# Patient Record
Sex: MALE | Race: WHITE | NOT HISPANIC OR LATINO | Employment: FULL TIME | URBAN - METROPOLITAN AREA
[De-identification: names, ages, dates, MRNs, and addresses within clinical notes are randomized per-mention and may not be internally consistent; named-entity substitution may affect disease eponyms.]

---

## 2024-09-06 ENCOUNTER — PATIENT MESSAGE (OUTPATIENT)
Dept: INTERNAL MEDICINE | Facility: CLINIC | Age: 34
End: 2024-09-06

## 2024-09-06 ENCOUNTER — LAB VISIT (OUTPATIENT)
Dept: LAB | Facility: HOSPITAL | Age: 34
End: 2024-09-06
Payer: COMMERCIAL

## 2024-09-06 ENCOUNTER — OFFICE VISIT (OUTPATIENT)
Dept: INTERNAL MEDICINE | Facility: CLINIC | Age: 34
End: 2024-09-06
Payer: COMMERCIAL

## 2024-09-06 VITALS
DIASTOLIC BLOOD PRESSURE: 76 MMHG | BODY MASS INDEX: 26.09 KG/M2 | OXYGEN SATURATION: 99 % | HEIGHT: 67 IN | WEIGHT: 166.25 LBS | HEART RATE: 81 BPM | SYSTOLIC BLOOD PRESSURE: 136 MMHG

## 2024-09-06 DIAGNOSIS — R21 RASH: ICD-10-CM

## 2024-09-06 DIAGNOSIS — Z00.00 ANNUAL PHYSICAL EXAM: Primary | ICD-10-CM

## 2024-09-06 DIAGNOSIS — Z12.83 SKIN EXAM, SCREENING FOR CANCER: ICD-10-CM

## 2024-09-06 DIAGNOSIS — J34.2 DEVIATED SEPTUM: ICD-10-CM

## 2024-09-06 DIAGNOSIS — Z11.59 NEED FOR HEPATITIS C SCREENING TEST: ICD-10-CM

## 2024-09-06 DIAGNOSIS — Z11.4 SCREENING FOR HIV (HUMAN IMMUNODEFICIENCY VIRUS): ICD-10-CM

## 2024-09-06 DIAGNOSIS — Z13.220 SCREENING FOR LIPID DISORDERS: ICD-10-CM

## 2024-09-06 DIAGNOSIS — Z00.00 ANNUAL PHYSICAL EXAM: ICD-10-CM

## 2024-09-06 PROBLEM — K30 FUNCTIONAL DYSPEPSIA: Status: ACTIVE | Noted: 2024-09-06

## 2024-09-06 PROBLEM — K62.5 RECTAL BLEEDING: Status: ACTIVE | Noted: 2024-09-06

## 2024-09-06 PROBLEM — R10.84 GENERALIZED ABDOMINAL PAIN: Status: ACTIVE | Noted: 2024-09-06

## 2024-09-06 PROBLEM — R19.8 CHANGE IN BOWEL FUNCTION: Status: ACTIVE | Noted: 2024-09-06

## 2024-09-06 LAB
ALBUMIN SERPL BCP-MCNC: 4.6 G/DL (ref 3.5–5.2)
ALP SERPL-CCNC: 45 U/L (ref 55–135)
ALT SERPL W/O P-5'-P-CCNC: 13 U/L (ref 10–44)
ANION GAP SERPL CALC-SCNC: 10 MMOL/L (ref 8–16)
AST SERPL-CCNC: 22 U/L (ref 10–40)
BASOPHILS # BLD AUTO: 0.03 K/UL (ref 0–0.2)
BASOPHILS NFR BLD: 0.6 % (ref 0–1.9)
BILIRUB SERPL-MCNC: 1.2 MG/DL (ref 0.1–1)
BUN SERPL-MCNC: 10 MG/DL (ref 6–20)
CALCIUM SERPL-MCNC: 9.4 MG/DL (ref 8.7–10.5)
CHLORIDE SERPL-SCNC: 104 MMOL/L (ref 95–110)
CHOLEST SERPL-MCNC: 194 MG/DL (ref 120–199)
CHOLEST/HDLC SERPL: 2.5 {RATIO} (ref 2–5)
CO2 SERPL-SCNC: 23 MMOL/L (ref 23–29)
CREAT SERPL-MCNC: 0.8 MG/DL (ref 0.5–1.4)
DIFFERENTIAL METHOD BLD: ABNORMAL
EOSINOPHIL # BLD AUTO: 0.1 K/UL (ref 0–0.5)
EOSINOPHIL NFR BLD: 1.5 % (ref 0–8)
ERYTHROCYTE [DISTWIDTH] IN BLOOD BY AUTOMATED COUNT: 12.9 % (ref 11.5–14.5)
EST. GFR  (NO RACE VARIABLE): >60 ML/MIN/1.73 M^2
GLUCOSE SERPL-MCNC: 81 MG/DL (ref 70–110)
HCT VFR BLD AUTO: 46.6 % (ref 40–54)
HCV AB SERPL QL IA: NORMAL
HDLC SERPL-MCNC: 78 MG/DL (ref 40–75)
HDLC SERPL: 40.2 % (ref 20–50)
HGB BLD-MCNC: 15.6 G/DL (ref 14–18)
HIV 1+2 AB+HIV1 P24 AG SERPL QL IA: NORMAL
IMM GRANULOCYTES # BLD AUTO: 0.02 K/UL (ref 0–0.04)
IMM GRANULOCYTES NFR BLD AUTO: 0.4 % (ref 0–0.5)
LDLC SERPL CALC-MCNC: 104.2 MG/DL (ref 63–159)
LYMPHOCYTES # BLD AUTO: 1.4 K/UL (ref 1–4.8)
LYMPHOCYTES NFR BLD: 28.8 % (ref 18–48)
MCH RBC QN AUTO: 32.1 PG (ref 27–31)
MCHC RBC AUTO-ENTMCNC: 33.5 G/DL (ref 32–36)
MCV RBC AUTO: 96 FL (ref 82–98)
MONOCYTES # BLD AUTO: 0.5 K/UL (ref 0.3–1)
MONOCYTES NFR BLD: 9.5 % (ref 4–15)
NEUTROPHILS # BLD AUTO: 2.8 K/UL (ref 1.8–7.7)
NEUTROPHILS NFR BLD: 59.2 % (ref 38–73)
NONHDLC SERPL-MCNC: 116 MG/DL
NRBC BLD-RTO: 0 /100 WBC
PLATELET # BLD AUTO: 225 K/UL (ref 150–450)
PMV BLD AUTO: 10.5 FL (ref 9.2–12.9)
POTASSIUM SERPL-SCNC: 4.2 MMOL/L (ref 3.5–5.1)
PROT SERPL-MCNC: 7.2 G/DL (ref 6–8.4)
RBC # BLD AUTO: 4.86 M/UL (ref 4.6–6.2)
SODIUM SERPL-SCNC: 137 MMOL/L (ref 136–145)
TRIGL SERPL-MCNC: 59 MG/DL (ref 30–150)
WBC # BLD AUTO: 4.75 K/UL (ref 3.9–12.7)

## 2024-09-06 PROCEDURE — 80053 COMPREHEN METABOLIC PANEL: CPT

## 2024-09-06 PROCEDURE — 87389 HIV-1 AG W/HIV-1&-2 AB AG IA: CPT

## 2024-09-06 PROCEDURE — 36415 COLL VENOUS BLD VENIPUNCTURE: CPT

## 2024-09-06 PROCEDURE — 85025 COMPLETE CBC W/AUTO DIFF WBC: CPT

## 2024-09-06 PROCEDURE — 86803 HEPATITIS C AB TEST: CPT

## 2024-09-06 PROCEDURE — 80061 LIPID PANEL: CPT

## 2024-09-06 PROCEDURE — 99999 PR PBB SHADOW E&M-NEW PATIENT-LVL V: CPT | Mod: PBBFAC,,,

## 2024-09-06 RX ORDER — CLOTRIMAZOLE AND BETAMETHASONE DIPROPIONATE 10; .64 MG/G; MG/G
CREAM TOPICAL 2 TIMES DAILY
Qty: 15 G | Refills: 1 | Status: SHIPPED | OUTPATIENT
Start: 2024-09-06

## 2024-09-06 RX ORDER — CLOTRIMAZOLE AND BETAMETHASONE DIPROPIONATE 10; .64 MG/G; MG/G
CREAM TOPICAL 2 TIMES DAILY
Qty: 15 G | Refills: 1 | Status: SHIPPED | OUTPATIENT
Start: 2024-09-06 | End: 2024-09-06

## 2024-09-06 NOTE — PROGRESS NOTES
Gurinder Tee  1990        Subjective     Chief Complaint: Annual Exam    History of Present Illness:  Mr. Gurinder Tee is a 34 y.o. male with a medical hx of H. Pylori infection (treated), hematemesis, and GERD who presents to clinic for his annual exam. He has not seen a doctor in many years and would like to re-establish care and get routine blood work. He has several issues today, 9/6/24, as well.     Skin Lesions: Pt has several skin lesions on his back he would like to have evaluated. He has had moles removed in the past by Dermatology. He states his moles have grown back, on some are painful now too.     Deviated Septum: Hx of deviated septum corrected with surgery at 18 years old. Admits he has had his nose broken 3 times since surgical correction. He now reports difficulty with airway in his L septum, also snores very loudly now.    Buttock Rash: Has had rash on his buttocks for the past 3-4 years. Describes rash as dry, irritated skin. He has taken over the counter lotions and has even been prescribed antifungals in the past with minimal alleviation of symptoms.      Health Maintenance Checklist:   Alcohol - Drinks beer and wine on a regular basis, 3 glasses of wine daily  Drug Use - none  Intimate Partner Violence - none  Depression- none  STIs - none  Vaccines - up to date  Cancer - skin cancer in family  Diet - well balanced diet  Smoking - none  HTN - none  HLD - none  Obesity - none  Activity - exercises 5 times a week  Diabetes - none  Sexual Activity - sexually active in monogamous relationship with wife of 10 years     Review of Systems   Constitutional:  Negative for chills and fever.   HENT:  Negative for ear pain and hearing loss.    Eyes:  Negative for blurred vision, double vision and photophobia.   Respiratory:  Negative for cough and shortness of breath.    Cardiovascular:  Negative for chest pain, palpitations and leg swelling.   Gastrointestinal:  Negative for abdominal pain, blood  in stool, constipation, diarrhea, nausea and vomiting.   Genitourinary:  Negative for dysuria and hematuria.   Musculoskeletal:         Skin rash   Skin:  Negative for rash.   Neurological:  Negative for dizziness, weakness and headaches.   Psychiatric/Behavioral:  Negative for depression and suicidal ideas.         PAST HISTORY:     Past Medical History:   Diagnosis Date    Deviated septum     H pylori ulcer     Hematemesis        Past Surgical History:   Procedure Laterality Date    NASAL SEPTUM SURGERY      TONSILLECTOMY         Family History   Problem Relation Name Age of Onset    Colon cancer Neg Hx      Colon polyps Neg Hx      Stomach cancer Neg Hx      Rectal cancer Neg Hx      Esophageal cancer Neg Hx         Social History     Socioeconomic History    Marital status:    Tobacco Use    Smoking status: Never    Smokeless tobacco: Never   Substance and Sexual Activity    Alcohol use: Yes     Alcohol/week: 24.0 standard drinks of alcohol     Types: 21 Glasses of wine, 3 Cans of beer per week    Drug use: Yes     Types: Marijuana     Comment: every other day    Sexual activity: Yes     Partners: Female     Social Determinants of Health     Financial Resource Strain: Low Risk  (9/5/2024)    Overall Financial Resource Strain (CARDIA)     Difficulty of Paying Living Expenses: Not very hard   Food Insecurity: No Food Insecurity (9/5/2024)    Hunger Vital Sign     Worried About Running Out of Food in the Last Year: Never true     Ran Out of Food in the Last Year: Never true   Physical Activity: Sufficiently Active (9/5/2024)    Exercise Vital Sign     Days of Exercise per Week: 5 days     Minutes of Exercise per Session: 90 min   Stress: Stress Concern Present (9/5/2024)    [employer] - Occupational Stress Questionnaire     Feeling of Stress : Very much   Housing Stability: Unknown (9/5/2024)    Housing Stability Vital Sign     Unable to Pay for Housing in the Last Year: No  "      MEDICATIONS & ALLERGIES:     Current Outpatient Medications on File Prior to Visit   Medication Sig    omeprazole (PRILOSEC) 40 MG capsule Take 1 capsule (40 mg total) by mouth every morning.     No current facility-administered medications on file prior to visit.       Review of patient's allergies indicates:  No Known Allergies    OBJECTIVE:     Vital Signs:  Vitals:    09/06/24 1020   BP: 136/76   BP Location: Left arm   Patient Position: Sitting   BP Method: Medium (Manual)   Pulse: 81   SpO2: 99%   Weight: 75.4 kg (166 lb 3.6 oz)   Height: 5' 7" (1.702 m)       Body mass index is 26.03 kg/m².     Physical Exam:  Physical Exam  Vitals and nursing note reviewed.   Constitutional:       Appearance: Normal appearance.   HENT:      Head: Normocephalic and atraumatic.      Nose: Nasal deformity and septal deviation present.      Mouth/Throat:      Mouth: Mucous membranes are moist.      Pharynx: Oropharynx is clear.   Eyes:      Extraocular Movements: Extraocular movements intact.      Conjunctiva/sclera: Conjunctivae normal.   Cardiovascular:      Rate and Rhythm: Normal rate and regular rhythm.      Pulses: Normal pulses.      Heart sounds: Normal heart sounds. No murmur heard.  Pulmonary:      Effort: Pulmonary effort is normal. No respiratory distress.      Breath sounds: Normal breath sounds. No wheezing, rhonchi or rales.   Abdominal:      General: Abdomen is flat. Bowel sounds are normal. There is no distension.      Palpations: Abdomen is soft.      Tenderness: There is no abdominal tenderness.   Musculoskeletal:         General: No swelling. Normal range of motion.      Cervical back: Normal range of motion.      Right lower leg: No edema.      Left lower leg: No edema.   Skin:     General: Skin is warm and dry.      Capillary Refill: Capillary refill takes less than 2 seconds.      Findings: Rash present.      Comments: Buttock rash located at top, mainly on R gluteal cheek    Neurological:      " "General: No focal deficit present.      Mental Status: He is alert and oriented to person, place, and time. Mental status is at baseline.      Motor: No weakness.   Psychiatric:         Mood and Affect: Mood normal.         Behavior: Behavior normal.                      Laboratory  No results found for: "WBC", "HGB", "HCT", "MCV", "PLT"  No results found for: "GLU", "NA", "K", "CL", "CO2", "BUN", "CREATININE", "CALCIUM", "MG"  No results found for: "INR", "PROTIME"  No results found for: "HGBA1C"  No results for input(s): "POCTGLUCOSE" in the last 72 hours.      Health Maintenance         Date Due Completion Date    Hepatitis C Screening Never done ---    Lipid Panel Never done ---    HIV Screening Never done ---    Influenza Vaccine (1) 09/01/2024 12/10/2014    COVID-19 Vaccine (2 - 2023-24 season) 09/01/2024 7/26/2021    TETANUS VACCINE 04/20/2028 4/20/2018              ASSESSMENT & PLAN:   Mr. Gurinder Tee is a 34 y.o. male who was seen today in clinic for annual exam. Overall he is doing well. Will make referral to ENT for deviated septum, I fear he may need another surgical correction given hx of trauma to the nose after initial surgical correction.     For his skin lesions, pictures available in media. Lesions are consistent with skin tags and possible lipoma in L upper back. Nevertheless, pt would benefit from a routine skin examination given his strong family hx of melanoma. He would also like skin lesions removed for cosmetic reasons. Referral made to Dermatology.    For his buttock rash, does not cause him significant pain. Upon examination there is a defined border around the rash, and central areas are very dry skin. Will prescribe Clotrimazole antifungal cream. Also educated on using over the counter Men's powder to help keep area dry going forward.     Will get routine lab work done today, 9/6/24.    Educated pt to cut back on alcohol use.     Will see again in 1 year for annual, or sooner if other " needs arise.     Gurinder was seen today for establish care.    Diagnoses and all orders for this visit:    Annual physical exam  -     CBC W/ AUTO DIFFERENTIAL; Future  -     COMPREHENSIVE METABOLIC PANEL; Future    Skin exam, screening for cancer  -     Ambulatory referral/consult to Dermatology; Future    Screening for HIV (human immunodeficiency virus)  -     HIV 1/2 Ag/Ab (4th Gen); Future    Need for hepatitis C screening test  -     HEPATITIS C ANTIBODY; Future    Rash    Screening for lipid disorders  -     Lipid Panel; Future    Deviated septum  -     Ambulatory referral/consult to ENT; Future    Other orders  -     Discontinue: clotrimazole-betamethasone 1-0.05% (LOTRISONE) cream; Apply topically 2 (two) times daily.  -     clotrimazole-betamethasone 1-0.05% (LOTRISONE) cream; Apply topically 2 (two) times daily.         1. Annual physical exam    2. Skin exam, screening for cancer    3. Screening for HIV (human immunodeficiency virus)    4. Need for hepatitis C screening test    5. Rash    6. Screening for lipid disorders    7. Deviated septum        RTC in 1 year    Quan Avila DO  Internal Medicine PGY-3  [facility]  [address] [place] [address]

## 2024-09-06 NOTE — PATIENT INSTRUCTIONS
Apply fungal cream to rash twice daily for the next 1-4 weeks or until the rash resolves. Try using Men's powder products to help keep area dry.     Make appointments with ENT and Dermatology.

## 2024-09-09 ENCOUNTER — OFFICE VISIT (OUTPATIENT)
Dept: OTOLARYNGOLOGY | Facility: CLINIC | Age: 34
End: 2024-09-09
Payer: COMMERCIAL

## 2024-09-09 VITALS
BODY MASS INDEX: 26.26 KG/M2 | SYSTOLIC BLOOD PRESSURE: 125 MMHG | WEIGHT: 167.69 LBS | DIASTOLIC BLOOD PRESSURE: 84 MMHG | HEART RATE: 65 BPM

## 2024-09-09 DIAGNOSIS — Z98.890 HISTORY OF NASAL SEPTOPLASTY: ICD-10-CM

## 2024-09-09 DIAGNOSIS — J34.2 DEVIATED SEPTUM: Primary | Chronic | ICD-10-CM

## 2024-09-09 DIAGNOSIS — M95.0 NASAL VALVE COLLAPSE: Chronic | ICD-10-CM

## 2024-09-09 DIAGNOSIS — R06.83 SNORING: Chronic | ICD-10-CM

## 2024-09-09 DIAGNOSIS — J31.0 CHRONIC RHINITIS: Chronic | ICD-10-CM

## 2024-09-09 PROCEDURE — 3079F DIAST BP 80-89 MM HG: CPT | Mod: CPTII,S$GLB,, | Performed by: OTOLARYNGOLOGY

## 2024-09-09 PROCEDURE — 1159F MED LIST DOCD IN RCRD: CPT | Mod: CPTII,S$GLB,, | Performed by: OTOLARYNGOLOGY

## 2024-09-09 PROCEDURE — 31575 DIAGNOSTIC LARYNGOSCOPY: CPT | Mod: S$GLB,,, | Performed by: OTOLARYNGOLOGY

## 2024-09-09 PROCEDURE — 99999 PR PBB SHADOW E&M-EST. PATIENT-LVL III: CPT | Mod: PBBFAC,,, | Performed by: OTOLARYNGOLOGY

## 2024-09-09 PROCEDURE — 1160F RVW MEDS BY RX/DR IN RCRD: CPT | Mod: CPTII,S$GLB,, | Performed by: OTOLARYNGOLOGY

## 2024-09-09 PROCEDURE — 3008F BODY MASS INDEX DOCD: CPT | Mod: CPTII,S$GLB,, | Performed by: OTOLARYNGOLOGY

## 2024-09-09 PROCEDURE — 99204 OFFICE O/P NEW MOD 45 MIN: CPT | Mod: 25,S$GLB,, | Performed by: OTOLARYNGOLOGY

## 2024-09-09 PROCEDURE — 3074F SYST BP LT 130 MM HG: CPT | Mod: CPTII,S$GLB,, | Performed by: OTOLARYNGOLOGY

## 2024-09-09 RX ORDER — FLUTICASONE PROPIONATE 50 MCG
1 SPRAY, SUSPENSION (ML) NASAL 2 TIMES DAILY
Qty: 11.1 ML | Refills: 11 | Status: SHIPPED | OUTPATIENT
Start: 2024-09-09 | End: 2024-10-09

## 2024-09-09 RX ORDER — AZELASTINE 1 MG/ML
1 SPRAY, METERED NASAL 2 TIMES DAILY
Qty: 30 ML | Refills: 11 | Status: SHIPPED | OUTPATIENT
Start: 2024-09-09 | End: 2024-10-09

## 2024-09-09 NOTE — PROGRESS NOTES
Chief Complaint   Patient presents with    Snoring     Very bad snore also stated that he has a deviated septum has had surgery in the past but nothing has changed overtime     Sinus Problem     Also states that when waking up in morning throat is swollen         HPI:   The pt is a 34 y.o. male with nasal congestion of many years duration. The congestion is reported to be moderate. Associated signs and symptoms are post nasal drip and sniffling. The patient ( patient representative) denies purulent runny nose, sneezing, sniffling, facial pain, headache, and cough.    There is a history of snoring. There is a of sleep disturbance . The following sleep abnormalities are noted: restless sleep, apnea, frequent awakening .  Patient feels that the snoring and nasal obstruction affect the quality of his sleep and are causing excessive daytime somnolence and fatigue.    The patient does not have asthma.  The patient does not have eczema.  The patient has been diagnosed with allergic rhinitis.     The patient has been treated with: OTC antihistamines and intranasal steroids, but Flonase has only used p.r.n..    The response to the above noted treatment is described as: minimal improvement.   The patient had septoplasty at age 18- had nasal trauma since that time (first time 2-3 mos after surgery)- at least 3 times.  No further surgeries were performed after the nasal traumas.    Uses breathe right strips at night- these help.                  Past Medical History:   Diagnosis Date    Deviated septum     H pylori ulcer     Hematemesis      Social History     Socioeconomic History    Marital status:    Tobacco Use    Smoking status: Never    Smokeless tobacco: Never   Substance and Sexual Activity    Alcohol use: Yes     Alcohol/week: 24.0 standard drinks of alcohol     Types: 21 Glasses of wine, 3 Cans of beer per week    Drug use: Yes     Types: Marijuana     Comment: every other day    Sexual activity: Yes      Partners: Female     Social Determinants of Health     Financial Resource Strain: Low Risk  (9/5/2024)    Overall Financial Resource Strain (CARDIA)     Difficulty of Paying Living Expenses: Not very hard   Food Insecurity: No Food Insecurity (9/5/2024)    Hunger Vital Sign     Worried About Running Out of Food in the Last Year: Never true     Ran Out of Food in the Last Year: Never true   Physical Activity: Sufficiently Active (9/5/2024)    Exercise Vital Sign     Days of Exercise per Week: 5 days     Minutes of Exercise per Session: 90 min   Stress: Stress Concern Present (9/5/2024)    [employer] - Occupational Stress Questionnaire     Feeling of Stress : Very much   Housing Stability: Unknown (9/5/2024)    Housing Stability Vital Sign     Unable to Pay for Housing in the Last Year: No     Past Surgical History:   Procedure Laterality Date    NASAL SEPTUM SURGERY      TONSILLECTOMY       Family History   Problem Relation Name Age of Onset    Colon cancer Neg Hx      Colon polyps Neg Hx      Stomach cancer Neg Hx      Rectal cancer Neg Hx      Esophageal cancer Neg Hx             Review of Systems  General: negative for chills, fever or weight loss  Psychological: negative for mood changes or depression  Ophthalmic: negative for blurry vision, photophobia or eye pain  ENT: see HPI  Respiratory: no cough, shortness of breath, or wheezing  Cardiovascular: no chest pain or dyspnea on exertion  Gastrointestinal: no abdominal pain, change in bowel habits, or black/ bloody stools  Musculoskeletal: negative for gait disturbance or muscular weakness  Neurological: no syncope or seizures; no ataxia  Dermatological: negative for pruritis,  rash and jaundice  Hematologic/lymphatic: no easy bruising, no new adenopathy      Physical Exam:    Vitals:    09/09/24 0906   BP: 125/84   Pulse: 65         Constitutional:   He is oriented to person, place, and time. Vital signs are normal. He appears  well-developed and well-nourished. He appears alert. He is cooperative. Normal speech.      Head:  Normocephalic and atraumatic. Head is without TMJ tenderness. Salivary glands normal.  Facial strength is normal.      Ears:  Hearing normal to normal and whispered voice; external ear normal without scars, lesions, or masses; ear canal, tympanic membrane, and middle ear normal., right ear hearing normal to normal and whispered voice; external ear normal without scars, lesions, or masses; ear canal, tympanic membrane, and middle ear normal. and left ear hearing normal to normal and whispered voice; external ear normal without scars, lesions, or masses; ear canal, tympanic membrane, and middle ear normal..   Right Ear: No middle ear effusion.   Left Ear:  No middle ear effusion.     Nose:  Mucosal edema and septal deviation (Septal deviation to right) present. No rhinorrhea or polyps. Turbinates abnormal and turbinate hypertrophy (3+, boggy, congested mucosa).  Right sinus exhibits no maxillary sinus tenderness and no frontal sinus tenderness. Left sinus exhibits no maxillary sinus tenderness and no frontal sinus tenderness.   Nasal valve collapse noted, worse on left.  Left side response to caudal maneuver.    Mouth/Throat  Oropharynx clear and moist without lesions or asymmetry, lips, teeth, and gums normal and oropharynx normal. No mucous membrane lesions. No oropharyngeal exudate, posterior oropharyngeal edema or posterior oropharyngeal erythema. Tonsils present, +1.  Mirror exam not performed due to patient tolerance.  Mirror exam not performed due to patient tolerance.    Mallampati class 2/3      Neck:  Neck normal without thyromegaly masses, asymmetry, normal tracheal structure, crepitus, and tenderness, thyroid normal, trachea normal, phonation normal, full range of motion with neck supple and no adenopathy. No JVD present. Carotid bruit is not present. No thyroid mass and no thyromegaly present.     He has no  cervical adenopathy.     Cardiovascular:    Normal rate, regular rhythm and rate and rhythm, heart sounds, and pulses normal.              Pulmonary/Chest:   Effort and breath sounds normal.     Psychiatric:   He has a normal mood and affect. His speech is normal and behavior is normal.     Neurological:   He is alert and oriented to person, place, and time. He has neurological normal, alert and oriented. No cranial nerve deficit.     Skin:   No abrasions, lacerations, lesions, or rashes.         Laryngoscopy    Date/Time: 9/9/2024 9:00 AM    Performed by: Gillian Rojo MD  Authorized by: Gillian Rojo MD    Consent Done?:  Yes (Verbal)  Anesthesia:     Local anesthetic:  Topical anesthetic    Patient tolerance:  Patient tolerated the procedure well with no immediate complications    Decongestion performed?: Yes    Laryngoscopy:     Areas examined:  Nasal cavities, vocal cords, nasopharynx, oropharynx, hypopharynx and larynx  Nose External:      No external nasal deformity  Nose Intranasal:      Mucosa no polyps     Mucosa ulcers not present     No mucosa lesions present     Septum gross deformity (deviated overall to right, spur inferior left, S shaped curvature overall)     Enlarged turbinates  Nasopharynx:      No mucosa lesions     Adenoids not present     Posterior choanae patent     Eustachian tube patent  Larynx/hypopharynx:      No epiglottis lesions     No epiglottis edema     No AE folds lesions     No vocal cord polyps     Equal and normal bilateral     No hypopharynx lesions     No piriform sinus pooling     No piriform sinus lesions     No post cricoid edema     No post cricoid erythema       Middle and superior meatus clear, sphenoethmoidal recess clear.    Nasal vestibule/opening narrow on left compared to right.          Assessment:    ICD-10-CM ICD-9-CM    1. Deviated septum  J34.2 470 Ambulatory referral/consult to ENT      Ambulatory referral/consult to ENT      2. Nasal valve collapse   M95.0 738.0       3. Chronic rhinitis  J31.0 472.0       4. History of nasal septoplasty  Z98.890 V45.89       5. Snoring  R06.83 786.09         The primary encounter diagnosis was Deviated septum. Diagnoses of Nasal valve collapse, Chronic rhinitis, History of nasal septoplasty, and Snoring were also pertinent to this visit.      Plan:    I discussed with the patient that because of his previous septoplasty and the nasal valve collapse, if he did want to address any of these issues surgically, it would be best for him to be evaluated by facial plastics for possible OSRP and correction of the nasal valve cartilages.      I would also recommend daily nasal spray to help with overall rhinitis, which may improve his symptoms as well.    I feel that his snoring is most likely due to his nasal issues, but if he does have persistent symptoms and it is affecting his sleep quality, we may also consider sleep study evaluation to assess for CECILIO.  I think it would make most sense to have evaluation by facial plastics 1st to discuss any nasal procedures that may be helpful.  Referral placed.    Patient will follow-up with me in approximately 3-4 months to assess response to nasal sprays and medical treatments, and to decide upon further workup or treatments if needed.    Gillian Rojo MD

## 2024-09-09 NOTE — PATIENT INSTRUCTIONS
Will place referral to ENT facial plastics for evaluation for OSRP and revision septoplasty to address septum and nasal valves.   Also can look outside [facility] at Dr. Gloria Dior or Dr. Bobby Mehta.      The patient was given a prescription for a nasal steroid and/or nasal antihistamine nasal spray and we discussed in detail the proper mechanism of use directing the spray away from the nasal septum.  In addition, we also discussed that it will take 3-4 weeks of daily use to achieve maximal effectiveness.  The patient will please call in 3-4 weeks with their progress.  If allergy symptoms persist at that time, we could consider additional medications and possibly allergy testing.     How do you use a Nasal Corticosteroid Spray?    Make sure you understand your dosing instructions. Spray only the number of prescribed sprays in each nostril. Read the package instructions before using your spray the first time.    Most corticosteroid sprays suggest the following steps:    Wash your hands well.    Gently blow your nose to clear the passageway.    Shake the container several times.    Tilt your head slightly downward.  Use the opposite hand from the nostril you will be spraying to hold the spray bottle.    Block one nostril with your finger.  Insert the nasal applicator into the other nostril.    Aim the spray toward the outer wall of the nostril.  Inhale slowly through the nose and press the .    Breathe out and repeat to apply the prescribed number of sprays.  Repeat these steps for the other nostril.     Avoid sneezing or blowing your nose right after spraying.

## 2024-09-16 ENCOUNTER — OFFICE VISIT (OUTPATIENT)
Dept: OTOLARYNGOLOGY | Facility: CLINIC | Age: 34
End: 2024-09-16
Payer: COMMERCIAL

## 2024-09-16 VITALS
HEART RATE: 57 BPM | SYSTOLIC BLOOD PRESSURE: 119 MMHG | DIASTOLIC BLOOD PRESSURE: 81 MMHG | WEIGHT: 166.44 LBS | BODY MASS INDEX: 26.07 KG/M2

## 2024-09-16 DIAGNOSIS — M95.0 NASAL VALVE COLLAPSE: Primary | ICD-10-CM

## 2024-09-16 DIAGNOSIS — G47.9 SLEEP DISTURBANCE: ICD-10-CM

## 2024-09-16 PROCEDURE — 3008F BODY MASS INDEX DOCD: CPT | Mod: CPTII,S$GLB,, | Performed by: OTOLARYNGOLOGY

## 2024-09-16 PROCEDURE — 99214 OFFICE O/P EST MOD 30 MIN: CPT | Mod: S$GLB,,, | Performed by: OTOLARYNGOLOGY

## 2024-09-16 PROCEDURE — 3079F DIAST BP 80-89 MM HG: CPT | Mod: CPTII,S$GLB,, | Performed by: OTOLARYNGOLOGY

## 2024-09-16 PROCEDURE — 1160F RVW MEDS BY RX/DR IN RCRD: CPT | Mod: CPTII,S$GLB,, | Performed by: OTOLARYNGOLOGY

## 2024-09-16 PROCEDURE — 1159F MED LIST DOCD IN RCRD: CPT | Mod: CPTII,S$GLB,, | Performed by: OTOLARYNGOLOGY

## 2024-09-16 PROCEDURE — 99999 PR PBB SHADOW E&M-EST. PATIENT-LVL III: CPT | Mod: PBBFAC,,, | Performed by: OTOLARYNGOLOGY

## 2024-09-16 PROCEDURE — 3074F SYST BP LT 130 MM HG: CPT | Mod: CPTII,S$GLB,, | Performed by: OTOLARYNGOLOGY

## 2024-09-16 NOTE — PROGRESS NOTES
Mr. Tee     Vitals:    24 0843   BP: 119/81   Pulse: (!) 57       Chief Complaint:  Other Misc (Snoring and trouble breathing  )       HPI:   is a 34-year-old white male who presents referred by Dr. Rojo for snoring and nasal obstruction.  He states that he snores terribly and a friend had recently video him sleeping showing him gasping for air.  He does have history of nasal trauma in 2010 and did undergo a previous septoplasty.  Dr. Rojo has placed him on Flonase nasal spray which he has been using.  He occasionally uses saline sinus rinses.  He denies any history of chronic recurrent sinus disease or of allergies.    SNOT22- 29 NOSE- 60    Review of Systems:  Constitutional:   weight loss or weight gain: Negative  Allergy/Immunologic:   Negative  Nasal Congestion/Obstruction:   Positive as above as well as runny nose  Nosebleeds:   Negative  Sinus infections:   Negative  Headache/Facial Pain:   Negative  Snoring/CECILIO:   Positive for snoring and sleep disturbance as above  Throat: Infections/Pain:   Negative  Hoarseness/Speech Disturbance:   Negative  Trauma Hx:  Negative    Cardiovascular:  M/I Angina: Negative  Hypertension: Negative  Endocrine:    DM/Steroids: Negative  GI:   Dysphagia/Reflux: Negative  :   GYN Pregnancy: Negative  Renal:   Dialysis: Negative  Lymphatic:   Neck Mass/Lymphadenopathy: Negative  Muscoloskeletal:   Negative  Hematologic:   Bleeding Disorders/Anemia: Negative  Neurologic:    Cranial/Neuralgia: Negative  Pulmonary:   Asthma/SOB/Cough: Negative  Skin Disorders: Negative    Past Medical/Surgical/Family/Social History:    ENT Surgery:  Status post PE tubes as a child  Occupational Exposure: Negative   Problems: Negative  Cancer: Negative    Past Family History:   Family history of Cancer: Negative    Past Social History:   Tobacco: Nonsmoker   Alcohol: Social Drinker      Allergies and medications: Reviewed per med card.    Physical  Examination:  Ears:   External auditory canals:  Clear   Hearing: Grossly intact   Tympanic Membranes:  Bilateral tympanosclerosis  Nose:   External:  Bilateral external valve collapse on moderate inspiration with positive Emmet maneuver.  This creates 60% obstruction   Intranasal:  Slight anterior septal deviation to the right with 2+ turbinates.  Mouth:   Intraorally: Lips, teeth, and gums: Normal   Oropharynx: Normal   Mucosa: Normal   Tongue: Normal  Throat:      Palate: Normal palate with elevation, Mallampati 1   Tonsils:  Minimal   Posterior Pharynx: Normal  Fiberoptic exam: Not performed  Head/Face:     Inspection: Normal and atraumatic   Palpation/Percussion: Non tender   Facial strength: Normal and symmetric   Salivary glands: Normal  Neck: Supple  Thyroid: No masses  Lymphatics: No nodes  Respiratory:   Effort: Normal  Eyes:   Ocular Mobility: Normal   Vision: Grossly intact  Neuro/Psych:   Cranial Nerves: Grossly Intact   Orientation: Normal   Mood/Affect: Normal      Assessment/Plan:  I have discussed my findings with him in detail as well as my recommendations for treatment.  I have encouraged him to use his saline sinus rinses utilizing distilled water only on a daily basis.  I have also recommended that he continue with his Flonase nasal sprays on a daily basis and I have described how this is to be administered.  I will place a referral for Sleep Medicine for him.  Surgically we discussed that he could benefit from nasal reconstruction with bilateral auricular cartilage Batten grafts.  Utilizing Q-tips I have shown him how this will change the shape of his nose.  He will follow-up with sleep Medicine and will contact us if he wishes to pursue surgical intervention.

## 2024-11-14 ENCOUNTER — OFFICE VISIT (OUTPATIENT)
Dept: SLEEP MEDICINE | Facility: CLINIC | Age: 34
End: 2024-11-14
Payer: COMMERCIAL

## 2024-11-14 DIAGNOSIS — G47.9 SLEEP DISTURBANCE: ICD-10-CM

## 2024-11-14 DIAGNOSIS — R06.83 SNORING: Primary | ICD-10-CM

## 2024-11-14 DIAGNOSIS — R40.0 SOMNOLENCE: ICD-10-CM

## 2024-11-14 DIAGNOSIS — R06.81 WITNESSED EPISODE OF APNEA: ICD-10-CM

## 2024-11-14 PROCEDURE — 1160F RVW MEDS BY RX/DR IN RCRD: CPT | Mod: CPTII,95,, | Performed by: NURSE PRACTITIONER

## 2024-11-14 PROCEDURE — 1159F MED LIST DOCD IN RCRD: CPT | Mod: CPTII,95,, | Performed by: NURSE PRACTITIONER

## 2024-11-14 PROCEDURE — 99204 OFFICE O/P NEW MOD 45 MIN: CPT | Mod: 95,,, | Performed by: NURSE PRACTITIONER

## 2024-11-14 NOTE — PROGRESS NOTES
The patient location is: [place]  The chief complaint leading to consultation is: trouble with sleep    Visit type: audiovisual    30 minutes of total time spent on the encounter, which includes face to face time and non-face to face time preparing to see the patient (eg, review of tests), Obtaining and/or reviewing separately obtained history, Documenting clinical information in the electronic or other health record, Independently interpreting results (not separately reported) and communicating results to the patient/family/caregiver, or Care coordination (not separately reported).     Each patient to whom he or she provides medical services by telemedicine is:  (1) informed of the relationship between the physician and patient and the respective role of any other health care provider with respect to management of the patient; and (2) notified that he or she may decline to receive medical services by telemedicine and may withdraw from such care at any time.    Referred by Jayro Virk III, MD     NEW PATIENT VISIT    Gurinder Tee  is a pleasant 34 y.o. male who presents in the Fall of 2024 for sleep evaluation.    See assessment below for further history.    Past Medical History:   Diagnosis Date    Deviated septum     H pylori ulcer     Hematemesis      Patient Active Problem List   Diagnosis    Multiple nevi    Oral lesion    GI bleeding    Change in bowel function    Functional dyspepsia    Generalized abdominal pain    Rectal bleeding     Current Outpatient Medications:     azelastine (ASTELIN) 137 mcg (0.1 %) nasal spray, 1 spray (137 mcg total) by Nasal route 2 (two) times daily., Disp: 30 mL, Rfl: 11    clotrimazole-betamethasone 1-0.05% (LOTRISONE) cream, Apply topically 2 (two) times daily. (Patient not taking: Reported on 9/9/2024), Disp: 15 g, Rfl: 1    omeprazole (PRILOSEC) 40 MG capsule, Take 1 capsule (40 mg total) by mouth every morning. (Patient not taking: Reported on 9/9/2024), Disp: 60  "capsule, Rfl: 3    There were no vitals filed for this visit.  Physical Exam:    GEN:   Well-appearing  Psych:  Appropriate affect, demonstrates insight  SKIN:  No rash on the face or bridge of the nose      LABS:   Lab Results   Component Value Date    CO2 23 09/06/2024         No echocardiogram results found for the past 12 months     No results found for: "FERRITIN"    RECORDS REVIEWED:      ASSESSMENT    Sig PMH:  PROBLEM DESCRIPTION/ Sx on Presentation  STATUS PLAN     Screening CECILIO   Presentation:     Referred by ENT; pt snores.  Also reports excessive tiredness, unrefreshing sleep.  Witnessed apneas.      yes - snoring  yes - gasping arousals/coughing  yes - witnessed apneas, pauses in breathing    no - night sweats    No, not recently - AM headaches    yes - dry mouth/sore throat      new   -we discussed sleep testing to evaluate for CECILIO     -discussed possible treatments for CECILIO including CPAP therapy       Daytime Sx     Does not feel refreshed when waking up in the morning. Does feel excessively sleepy during the day and/or periods of rest. Dozes often.    ESS 15/24 on intake      new   -will reassess sleepiness after evaluation for CECILIO     Insomnia       SLEEP SCHEDULE   Duration    Wind- down    Envmnt    CBTi    Meds prior    Meds now    Bed Time 1130PM-12AM   Lights out    Latency 10-20min   Arousals 1-2x   Back to sleep Up to 60min   Stim. ctrl    Wake time 7AM   Caffeine    Naps    Nocturia 1-2x   Work                    new   -will reassess after evaluation for sleep-disordered breathing       Nocturia     x 1-2 per sleep period    new          RTC:  will arrange RTC depending on results of sleep testing         PLAN      -recommend sleep testing   -HST ordered  -discussed trial therapy if CECILIO present and the patient is open to a trial of CPAP therapy  -discussed the etiology of obstructive sleep apnea as well as the potential ramifications of untreated sleep apnea, which could include daytime " sleepiness, hypertension, heart disease and/or stroke. We discussed potential treatment options, which could include weight loss, body positioning, continuous positive airway pressure (CPAP), oral appliance, Inspire, or referral for surgical consideration.   -advised on strict driving precautions; advised never to drive drowsy     Advised on plan of care. Answered all patient questions. Patient verbalized understanding and voiced agreement with plan of care.     RTC if dx of CECILIO made and CPAP ordered, will need follow up 31-90 days after receiving machine for compliance

## 2024-12-05 ENCOUNTER — TELEPHONE (OUTPATIENT)
Dept: SLEEP MEDICINE | Facility: OTHER | Age: 34
End: 2024-12-05
Payer: COMMERCIAL

## 2024-12-06 ENCOUNTER — HOSPITAL ENCOUNTER (OUTPATIENT)
Dept: SLEEP MEDICINE | Facility: OTHER | Age: 34
Discharge: HOME | End: 2024-12-06
Attending: NURSE PRACTITIONER
Payer: COMMERCIAL

## 2024-12-06 DIAGNOSIS — R06.81 WITNESSED EPISODE OF APNEA: ICD-10-CM

## 2024-12-06 DIAGNOSIS — R06.83 SNORING: ICD-10-CM

## 2024-12-06 DIAGNOSIS — R40.0 SOMNOLENCE: ICD-10-CM

## 2024-12-06 DIAGNOSIS — G47.9 SLEEP DISTURBANCE: ICD-10-CM

## 2024-12-06 PROCEDURE — 95800 SLP STDY UNATTENDED: CPT

## 2024-12-06 NOTE — PROGRESS NOTES
Per physician orders, patient was given home sleep testing device and instructed on how to apply the device before going to bed tonight.  I sized the device and showed the patient using a mirror how the device fits and what it should look like so they can use a mirror when putting it on themselves at home.  We reviewed the instruction booklet and the number to call if they have any questions at night.  Patient understood and was instructed to return the device the next day

## 2024-12-09 PROBLEM — G47.9 SLEEP DISTURBANCE: Status: ACTIVE | Noted: 2024-12-09

## 2024-12-10 ENCOUNTER — PATIENT MESSAGE (OUTPATIENT)
Dept: SLEEP MEDICINE | Facility: CLINIC | Age: 34
End: 2024-12-10
Payer: COMMERCIAL

## 2025-01-10 DIAGNOSIS — G47.33 OBSTRUCTIVE SLEEP APNEA: Primary | ICD-10-CM

## 2025-04-01 ENCOUNTER — OFFICE VISIT (OUTPATIENT)
Dept: FAMILY MEDICINE | Facility: CLINIC | Age: 35
End: 2025-04-01
Payer: COMMERCIAL

## 2025-04-01 VITALS
SYSTOLIC BLOOD PRESSURE: 120 MMHG | HEART RATE: 67 BPM | BODY MASS INDEX: 25.96 KG/M2 | WEIGHT: 165.38 LBS | DIASTOLIC BLOOD PRESSURE: 70 MMHG | TEMPERATURE: 98 F | OXYGEN SATURATION: 98 % | HEIGHT: 67 IN

## 2025-04-01 DIAGNOSIS — L02.31 ABSCESS OF BUTTOCK: Primary | ICD-10-CM

## 2025-04-01 PROCEDURE — 99999 PR PBB SHADOW E&M-EST. PATIENT-LVL III: CPT | Mod: PBBFAC,,, | Performed by: STUDENT IN AN ORGANIZED HEALTH CARE EDUCATION/TRAINING PROGRAM

## 2025-04-01 RX ORDER — DOXYCYCLINE 100 MG/1
100 CAPSULE ORAL 2 TIMES DAILY
Qty: 14 CAPSULE | Refills: 0 | Status: SHIPPED | OUTPATIENT
Start: 2025-04-01

## 2025-04-01 NOTE — PROGRESS NOTES
"Problem visit/UC  Chief Complaint   Patient presents with    Mass     Patient stated that he has a lump on his Coccyx area x 1 days, rash to inner buttocks.       Subjective   Patient ID: Gurinder Tee is a 34 y.o. male.    HPI  Gurinder Tee is a 34 y.o. who presents with buttock rash/abscess.   Patient is being seen today for same day sick visit. Patient is new to provider. Dr. Avila is his PCP    The patient reports for the last 3 years he has been having a rash on his buttuck area for which he has seen 3 different drs over the years and always given an antifinal cream which never resolves it. He states always there since first appears but yesterday It got worse. Last saw his PCP in 9/2024 regarding this issue and was given Lortisone per chart reivew at that time      Review of Systems  Objective      Vitals:    04/01/25 1300   BP: 120/70   Pulse: 67   Temp: 97.8 °F (36.6 °C)   TempSrc: Oral   SpO2: 98%   Weight: 75 kg (165 lb 5.5 oz)   Height: 5' 7" (1.702 m)       Physical Exam  Vitals reviewed. Exam conducted with a chaperone present.   Constitutional:       General: He is not in acute distress.     Appearance: Normal appearance. He is not ill-appearing or toxic-appearing.   HENT:      Head: Normocephalic and atraumatic.   Eyes:      Conjunctiva/sclera: Conjunctivae normal.   Musculoskeletal:      Cervical back: Neck supple.   Skin:         Neurological:      Mental Status: He is alert.         Procedures         Assessment & Plan   Abscess of buttock  - The patient with 3 yrs hx of presumed fungal infection in gluteal cleft for which he reports that he has been given multiple anti-fungal without improvement  - He reports that he notice yesterday the area had become larger and form - on exam consistent with non fluctuant abscess. Will treat with doxycycline and referral to dermatology for evaluation   -     Ambulatory referral/consult to Dermatology; Future; Expected date: 04/08/2025  -     doxycycline " (MONODOX) 100 MG capsule; Take 1 capsule (100 mg total) by mouth 2 (two) times daily.  Dispense: 14 capsule; Refill: 0        Patient given return precautions and instructed to FU with PCP regarding symptoms.

## 2025-04-07 ENCOUNTER — OFFICE VISIT (OUTPATIENT)
Dept: SLEEP MEDICINE | Facility: CLINIC | Age: 35
End: 2025-04-07
Payer: COMMERCIAL

## 2025-04-07 DIAGNOSIS — G47.33 OBSTRUCTIVE SLEEP APNEA: Primary | ICD-10-CM

## 2025-04-07 PROCEDURE — 98006 SYNCH AUDIO-VIDEO EST MOD 30: CPT | Mod: 95,,, | Performed by: NURSE PRACTITIONER

## 2025-04-07 PROCEDURE — 1160F RVW MEDS BY RX/DR IN RCRD: CPT | Mod: CPTII,95,, | Performed by: NURSE PRACTITIONER

## 2025-04-07 PROCEDURE — 1159F MED LIST DOCD IN RCRD: CPT | Mod: CPTII,95,, | Performed by: NURSE PRACTITIONER

## 2025-04-07 NOTE — PROGRESS NOTES
The patient location is: [place]  The chief complaint leading to consultation is: trouble with sleep    Visit type: audiovisual    30 minutes of total time spent on the encounter, which includes face to face time and non-face to face time preparing to see the patient (eg, review of tests), Obtaining and/or reviewing separately obtained history, Documenting clinical information in the electronic or other health record, Independently interpreting results (not separately reported) and communicating results to the patient/family/caregiver, or Care coordination (not separately reported).     Each patient to whom he or she provides medical services by telemedicine is:  (1) informed of the relationship between the physician and patient and the respective role of any other health care provider with respect to management of the patient; and (2) notified that he or she may decline to receive medical services by telemedicine and may withdraw from such care at any time.    ESTABLISHED PATIENT VISIT    Here today for:  follow-up on CPAP machine/compliance visit    Since last visit:   See assessment below      Past Medical History:   Diagnosis Date    Deviated septum     H pylori ulcer     Hematemesis      Patient Active Problem List   Diagnosis    Multiple nevi    Oral lesion    GI bleeding    Change in bowel function    Functional dyspepsia    Generalized abdominal pain    Rectal bleeding    Sleep disturbance     Current Outpatient Medications:     azelastine (ASTELIN) 137 mcg (0.1 %) nasal spray, 1 spray (137 mcg total) by Nasal route 2 (two) times daily., Disp: 30 mL, Rfl: 11    clotrimazole-betamethasone 1-0.05% (LOTRISONE) cream, Apply topically 2 (two) times daily. (Patient not taking: Reported on 9/9/2024), Disp: 15 g, Rfl: 1    doxycycline (MONODOX) 100 MG capsule, Take 1 capsule (100 mg total) by mouth 2 (two) times daily., Disp: 14 capsule, Rfl: 0    omeprazole (PRILOSEC) 40 MG capsule, Take 1 capsule (40 mg total)  "by mouth every morning. (Patient not taking: Reported on 9/9/2024), Disp: 60 capsule, Rfl: 3    There were no vitals filed for this visit.  Physical Exam:    GEN:   Well-appearing  Psych:  Appropriate affect, demonstrates insight  SKIN:  No rash on the face or bridge of the nose      LABS:   No results found for: "CO2"        No echocardiogram results found for the past 12 months     No results found for: "FERRITIN"    RECORDS REVIEWED:  [facility] 12.7.24: AHi 28, RDI 42    ASSESSMENT    Sig PMH:  PROBLEM DESCRIPTION/ Sx on Presentation Interval Hx STATUS PLAN     Screening CECILIO   Presentation 11.14.24:    Referred by ENT; pt snores.  Also reports excessive tiredness, unrefreshing sleep.  Witnessed apneas.    yes - snoring  yes - gasping arousals/coughing  yes - witnessed apneas, pauses in breathing    no - night sweats    No, not recently - AM headaches    yes - dry mouth/sore throat    PAP history   Dx Study [facility] 12.7.24: AHi 28, RDI 42   Mask Nasal   DME HME   My Air    CPAP age AV, setup 1.27.25, Airsense 11   PAP altn    Benefits    PROBS       Today:    Using cpap nightly and loves it. Feels a big difference in his overall sleep quality and energy throughout the day.              Download 04/07/2025:  30/30 x 6h10m: 6-12 (6.6/8.0/8.8), leak 3.8/7.96/22.2, PS 3, AHI 0.5   controlled     PAP PLAN   E min 6 cwp    I max 12 cwp   PS/epr    RAMP    Other    Altn.    Press  chg      Residual predicted AHI within optimal range.    Pt is using and benefitting from CPAP therapy.    Rx supplies     Daytime Sx     Does not feel refreshed when waking up in the morning. Does feel excessively sleepy during the day and/or periods of rest. Dozes often.    ESS 15/24 on intake 11.14.24               ESS 7/24 on intake today   controlled   -continue treatment of CECILIO as above     Insomnia       SLEEP SCHEDULE   Duration    Wind- down    Envmnt    CBTi    Meds prior    Meds now    Bed Time 1130PM-12AM   Lights out    Latency " 10-20min   Arousals 1-2x   Back to sleep Up to 60min   Stim. ctrl    Wake time 7AM   Caffeine    Naps    Nocturia 1-2x   Work                    Wakes up once, typically to use bathroom.   controlled   -continue treatment of CECILIO as above       Nocturia     x 1-2 per sleep period    x 1 per sleep period   stable          RTC:  will arrange RTC depending on results of sleep testing